# Patient Record
Sex: FEMALE | Race: WHITE | Employment: UNEMPLOYED | URBAN - METROPOLITAN AREA
[De-identification: names, ages, dates, MRNs, and addresses within clinical notes are randomized per-mention and may not be internally consistent; named-entity substitution may affect disease eponyms.]

---

## 2017-12-07 ENCOUNTER — TELEPHONE (OUTPATIENT)
Dept: SURGERY | Facility: CLINIC | Age: 37
End: 2017-12-07

## 2020-01-02 ENCOUNTER — WALK IN (OUTPATIENT)
Dept: URGENT CARE | Age: 40
End: 2020-01-02

## 2020-01-02 VITALS
OXYGEN SATURATION: 96 % | SYSTOLIC BLOOD PRESSURE: 114 MMHG | WEIGHT: 116.4 LBS | DIASTOLIC BLOOD PRESSURE: 70 MMHG | RESPIRATION RATE: 18 BRPM | BODY MASS INDEX: 19.39 KG/M2 | HEIGHT: 65 IN | HEART RATE: 88 BPM | TEMPERATURE: 98.6 F

## 2020-01-02 DIAGNOSIS — J01.10 ACUTE NON-RECURRENT FRONTAL SINUSITIS: Primary | ICD-10-CM

## 2020-01-02 LAB
INTERNAL PROCEDURAL CONTROLS ACCEPTABLE: YES
S PYO AG THROAT QL IA.RAPID: NEGATIVE

## 2020-01-02 PROCEDURE — X0943 AMG SELF PAY VISIT: HCPCS | Performed by: NURSE PRACTITIONER

## 2020-01-02 RX ORDER — LEVOTHYROXINE SODIUM 0.03 MG/1
TABLET ORAL
Refills: 2 | COMMUNITY
Start: 2019-11-14

## 2020-01-02 RX ORDER — AZITHROMYCIN 250 MG/1
TABLET, FILM COATED ORAL
Qty: 6 TABLET | Refills: 0 | Status: SHIPPED | OUTPATIENT
Start: 2020-01-02 | End: 2020-01-07

## 2020-01-02 SDOH — HEALTH STABILITY: MENTAL HEALTH: HOW OFTEN DO YOU HAVE A DRINK CONTAINING ALCOHOL?: NEVER

## 2020-01-02 ASSESSMENT — ENCOUNTER SYMPTOMS
SINUS PAIN: 1
SORE THROAT: 1
FEVER: 1
RHINORRHEA: 1
FATIGUE: 1
EYES NEGATIVE: 1
COUGH: 1
FACIAL SWELLING: 1

## 2020-03-19 ENCOUNTER — HOSPITAL ENCOUNTER (EMERGENCY)
Facility: HOSPITAL | Age: 40
Discharge: LEFT BEFORE DISCHARGE | End: 2020-03-19
Payer: MEDICARE

## 2020-03-19 VITALS
BODY MASS INDEX: 17.07 KG/M2 | HEIGHT: 64 IN | RESPIRATION RATE: 16 BRPM | WEIGHT: 100 LBS | DIASTOLIC BLOOD PRESSURE: 77 MMHG | HEART RATE: 111 BPM | TEMPERATURE: 98 F | OXYGEN SATURATION: 96 % | SYSTOLIC BLOOD PRESSURE: 120 MMHG

## 2020-03-19 RX ORDER — LEVOTHYROXINE SODIUM 0.03 MG/1
25 TABLET ORAL
COMMUNITY

## 2023-12-12 PROBLEM — F32.A DEPRESSION: Status: ACTIVE | Noted: 2023-12-12

## 2023-12-12 PROBLEM — F29 PSYCHOSIS (HCC): Status: ACTIVE | Noted: 2023-12-12

## 2023-12-12 PROBLEM — R62.50 DEVELOPMENTAL DELAY: Status: ACTIVE | Noted: 2023-12-12

## 2024-04-03 ENCOUNTER — OFFICE VISIT (OUTPATIENT)
Facility: LOCATION | Age: 44
End: 2024-04-03
Payer: MEDICARE

## 2024-04-03 DIAGNOSIS — H93.293 ABNORMAL AUDITORY PERCEPTION OF BOTH EARS: Primary | ICD-10-CM

## 2024-04-03 DIAGNOSIS — H90.0 CONDUCTIVE HEARING LOSS, BILATERAL: Primary | ICD-10-CM

## 2024-04-03 DIAGNOSIS — H90.3 SENSORY HEARING LOSS, BILATERAL: ICD-10-CM

## 2024-04-03 PROCEDURE — 92557 COMPREHENSIVE HEARING TEST: CPT | Performed by: AUDIOLOGIST

## 2024-04-03 PROCEDURE — 99204 OFFICE O/P NEW MOD 45 MIN: CPT | Performed by: OTOLARYNGOLOGY

## 2024-04-03 NOTE — PROGRESS NOTES
Susan was seen for an audiometric evaluation today.  Referred back to physician.      Inga Pollard M.A., JACE-A  Audiologist

## 2024-04-03 NOTE — PROGRESS NOTES
[facility], [facility]    Report of Consultation    Date of Consult: 4/3/2024     Reason for Consultation:   Decreased hearing.    History of Present Illness:   Patient is a 43 year old female who is being seen for decreased hearing.  She is worn a hearing aid for years and her right side and recently was told there was significant wax.  She complains of being plugged up on that side.  She also had history of mastoidectomy for a atretic ear canal in [place] but surgery was not successful.  She had been seen at the [place] [facility] who really did not want further surgical intervention.    Past Medical History  Past Medical History:   Diagnosis Date    Autism (HCC)     Depression     Hearing impaired     Thyroid disease        Past Surgical History  Past Surgical History:   Procedure Laterality Date    OTHER SURGICAL HISTORY  1987    ear surgery    REMOVAL GALLBLADDER         Family History  History reviewed. No pertinent family history.    Social History  Pediatric History   Patient Parents    Kendra Cowan (Mother)    Sheldon Cowan (Father)     Other Topics Concern    Not on file   Social History Narrative    Not on file           Current Medications:  Current Outpatient Medications   Medication Sig Dispense Refill    ergocalciferol 1.25 MG (79408 UT) Oral Cap Take 1 capsule (50,000 Units total) by mouth once a week.      FLUoxetine 10 MG Oral Cap Take 1 capsule (10 mg total) by mouth daily.      ferrous sulfate 325 (65 FE) MG Oral Tab EC Take 1 tablet (325 mg total) by mouth daily.      Brexpiprazole (REXULTI) 1 MG Oral Tab Take 1 mg by mouth daily. Pt started taking medication yesterday      Levothyroxine Sodium 25 MCG Oral Tab Take 1 tablet (25 mcg total) by mouth before breakfast.      Cyanocobalamin (VITAMIN B 12 OR) Take by mouth.      docusate sodium 100 MG Oral Cap Take 100 mg by mouth 2 (two) times daily.      FLUoxetine HCl 10 MG Oral Cap Take 2 capsules (20 mg  total) by mouth daily.      levothyroxine Sodium in Sodium Chloride Inject 25 mcg into the vein daily. (Patient not taking: Reported on 12/11/2023)         Allergies  No Known Allergies    Review of Systems:   A comprehensive review of systems was negative.    Physical Exam:   Last menstrual period 12/09/2023.         Constitutional Normal Overall appearance - Normal.   Psychiatric Normal Orientation - Oriented to time, place, person & situation. Appropriate mood and affect.   Head/Face Normal Facial features -- Normal. Skull - Normal.   Eyes Normal Pupils equal ,round ,react to light and accomidate   Ears Normal External Ear Right: Normal, Left: Normal. Canal - Right: Significant amount of wax was removed by suction, Left: Ends in a blind pouch without appreciable TM TM - Right: Normal,    Nose Normal External Nose, Normal, Septum -Midline, Right, Left Turbinates - Right: Normal, Hypertrophic Left: Normal, Hypertrophic   Mouth/Throat Normal Lips/teeth/gums - Normal. Tonsils - Normal. Oropharynx - Normal.   Neck Exam Normal Inspection - Normal. Palpation - Normal. Parotid gland - Normal. Thyroid gland - Normal.   Neurological Normal Memory - Normal. Cranial nerves - Cranial nerves II through XII grossly intact.   Nasopharynx Normal  Normal        Skin Normal Inspection - Normal.        Lymph Detail Normal Submental. Submandibular. Anterior cervical. Posterior cervical. Supraclavicular.     Audiogram shows a moderate sensorineural hearing loss with superimposed conductive loss.  Results:     Laboratory Data:  Lab Results   Component Value Date    WBC 6.4 12/11/2023    HGB 10.6 (L) 12/11/2023    HCT 33.7 (L) 12/11/2023    .0 (L) 12/11/2023    CREATSERUM 1.04 (H) 12/11/2023    BUN 5 (L) 12/11/2023     12/11/2023    K 3.8 12/11/2023     12/11/2023    CO2 28.0 12/11/2023     (H) 12/11/2023    CA 8.3 (L) 12/11/2023    ALB 3.5 12/11/2023    ALKPHO 67 12/11/2023    TP 6.7 12/11/2023    AST <3 (L)  12/11/2023    ALT 8 (L) 12/11/2023    PTT 28.2 03/12/2020    TSH 0.99 03/12/2020    LIP 97 06/19/2016    ETOH <3 12/11/2023         Imaging:  No results found.      Impression:   Right-sided cerumen impaction is resolved.  Moderate concern hearing loss with superimposed conductive loss.    Recommendations:  She is instructed how to clean wax and will see her audiologist regarding amplification.  I recommend they see  for evaluation for possible bone implantable hearing aid or if anything else surgically can be done to either ear.    The patient understands her treatment plan.      Thank you for allowing me to participate in the care of your patient.      Erick Smiley MD  4/3/2024

## 2025-01-10 ENCOUNTER — MED REC SCAN ONLY (OUTPATIENT)
Facility: LOCATION | Age: 45
End: 2025-01-10

## 2025-02-19 ENCOUNTER — OFFICE VISIT (OUTPATIENT)
Facility: LOCATION | Age: 45
End: 2025-02-19
Payer: MEDICARE

## 2025-02-19 DIAGNOSIS — H61.23 BILATERAL IMPACTED CERUMEN: Primary | ICD-10-CM

## 2025-02-19 DIAGNOSIS — Q16.1 ATRESIA OF EXTERNAL AUDITORY CANAL: ICD-10-CM

## 2025-02-19 PROCEDURE — 92504 EAR MICROSCOPY EXAMINATION: CPT | Performed by: OTOLARYNGOLOGY

## 2025-02-19 PROCEDURE — 99214 OFFICE O/P EST MOD 30 MIN: CPT | Performed by: OTOLARYNGOLOGY

## 2025-02-19 NOTE — PROGRESS NOTES
Susan Cowan is a 44 year old female. No chief complaint on file.    HPI:   44-year-old female has hearing in the right ear and history of atresia left side surgery was unsuccessful.  Comes in for wax removal.  Current Outpatient Medications   Medication Sig Dispense Refill    ergocalciferol 1.25 MG (58765 UT) Oral Cap Take 1 capsule (50,000 Units total) by mouth once a week.      FLUoxetine 10 MG Oral Cap Take 1 capsule (10 mg total) by mouth daily.      ferrous sulfate 325 (65 FE) MG Oral Tab EC Take 1 tablet (325 mg total) by mouth daily.      Brexpiprazole (REXULTI) 1 MG Oral Tab Take 1 mg by mouth daily. Pt started taking medication yesterday      Levothyroxine Sodium 25 MCG Oral Tab Take 1 tablet (25 mcg total) by mouth before breakfast.      Cyanocobalamin (VITAMIN B 12 OR) Take by mouth.      docusate sodium 100 MG Oral Cap Take 100 mg by mouth 2 (two) times daily.      FLUoxetine HCl 10 MG Oral Cap Take 2 capsules (20 mg total) by mouth daily.      levothyroxine Sodium in Sodium Chloride Inject 25 mcg into the vein daily. (Patient not taking: Reported on 12/11/2023)        Past Medical History:    Autism (HCC)    Depression    Hearing impaired    Thyroid disease      Social History:  Social History     Socioeconomic History    Marital status: Single   Tobacco Use    Smoking status: Never   Substance and Sexual Activity    Alcohol use: Never     Social Drivers of Health     Food Insecurity: No Food Insecurity (12/17/2023)    [facility], [facility]    Hunger Vital Sign     Worried About Running Out of Food in the Last Year: Never true     Ran Out of Food in the Last Year: Never true   Transportation Needs: No Transportation Needs (12/17/2023)    Received from [facility], [facility]    PRAPARE - Transportation     Lack of Transportation (Medical): No     Lack of Transportation (Non-Medical): No    Housing Stability: Low Risk  (12/17/2023)    Received from [facility], [facility]    Housing Stability Vital Sign     Unable to Pay for Housing in the Last Year: No     Number of Places Lived in the Last Year: 1     Unstable Housing in the Last Year: No      Past Surgical History:   Procedure Laterality Date    Other surgical history  1987    ear surgery    Removal gallbladder           REVIEW OF SYSTEMS:   GENERAL HEALTH: feels well otherwise  GENERAL : denies fever, chills, sweats, weight loss, weight gain  SKIN: denies any unusual skin lesions or rashes  RESPIRATORY: denies shortness of breath with exertion  NEURO: denies headaches    EXAM:   There were no vitals taken for this visit.    System Pertinent findings Details   Constitutional  Overall appearance - Normal.   Head/Face  Facial features -- Normal. Skull - Normal.   Eyes  Pupils equal ,round ,react to light and accomidate   Ears  External Ear Right: Normal, Left: Normal. Canal - Right: Normal, Left: Normal. TM - Right: Wax removed TM normal left: Some wax removed although atretic ear canal noted   Nose  External Nose, Normal, Septum -midline,Nasal Vault, clear. Turbinates - Right: Normal left: Normal   Mouth/Throat  Lips/teeth/gums - Normal. Tonsils -1+ oropharynx - Normal.   Neck Exam  Inspection - Normal. Palpation - Normal. Parotid gland - Normal. Thyroid gland -normal   Lymph Detail  Submental. Submandibular. Anterior cervical. Posterior cervical. Supraclavicular.     Patients exam showed wax impaction right ear, wax impaction left ear. Ear wax was removed under the operative microscope. No complications. Patient tolerated the procedure well. Ear exam findings listed in note after removal.    ASSESSMENT AND PLAN:   1. Bilateral impacted cerumen  3-month follow-up    2. Atresia of external auditory canal  Referral to neurotology Dr. Antwon Gastelum consideration whether BAHA implant could be done at [facility]  ear      The patient indicates understanding of these issues and agrees to the plan.      Braeden Arriaga MD  2/19/2025  5:00 PM

## 2025-05-19 ENCOUNTER — OFFICE VISIT (OUTPATIENT)
Facility: LOCATION | Age: 45
End: 2025-05-19
Payer: MEDICARE

## 2025-05-19 DIAGNOSIS — H61.23 BILATERAL IMPACTED CERUMEN: Primary | ICD-10-CM

## 2025-05-19 PROCEDURE — 99214 OFFICE O/P EST MOD 30 MIN: CPT | Performed by: OTOLARYNGOLOGY

## 2025-05-19 PROCEDURE — 92504 EAR MICROSCOPY EXAMINATION: CPT | Performed by: OTOLARYNGOLOGY

## 2025-05-19 NOTE — PROGRESS NOTES
Susan Cowan is a 44 year old female. No chief complaint on file.    HPI:   44-year-old white female history of wax impaction patient wears hearing aid in the right ear.  Patient had atresia of the left ear with surgery being unsuccessful.  Therefore only hearing ear is the right ear.  Current Medications[1]   Past Medical History[2]   Social History:  Short Social Hx on File[3]   Past Surgical History[4]      REVIEW OF SYSTEMS:   GENERAL HEALTH: feels well otherwise  GENERAL : denies fever, chills, sweats, weight loss, weight gain  SKIN: denies any unusual skin lesions or rashes  RESPIRATORY: denies shortness of breath with exertion  NEURO: denies headaches    EXAM:   There were no vitals taken for this visit.    System Pertinent findings Details   Constitutional  Overall appearance - Normal.   Head/Face  Facial features -- Normal. Skull - Normal.   Eyes  Pupils equal ,round ,react to light and accomidate   Ears  External Ear Right: Normal, Left: Normal. Canal - Right: Normal, Left: Normal. TM - Right: Wax removed TM normal left: Wax removed this is the atresia ear cannot see TM   Nose  External Nose, Normal, Septum -midline,Nasal Vault, clear. Turbinates - Right: Normal left: Normal   Mouth/Throat  Lips/teeth/gums - Normal. Tonsils -1+ oropharynx - Normal.   Neck Exam  Inspection - Normal. Palpation - Normal. Parotid gland - Normal. Thyroid gland -normal   Lymph Detail  Submental. Submandibular. Anterior cervical. Posterior cervical. Supraclavicular.   Patients exam showed wax impaction right ear, wax impaction left ear. Ear wax was removed under the operative microscope. No complications. Patient tolerated the procedure well. Ear exam findings listed in note after removal.      ASSESSMENT AND PLAN:   1. Bilateral impacted cerumen  Follow-up 3 months      The patient indicates understanding of these issues and agrees to the plan.      Braeden Arriaga MD  5/19/2025  4:09 PM       [1]   Current Outpatient  Medications   Medication Sig Dispense Refill    ergocalciferol 1.25 MG (64511 UT) Oral Cap Take 1 capsule (50,000 Units total) by mouth once a week.      FLUoxetine 10 MG Oral Cap Take 1 capsule (10 mg total) by mouth daily.      ferrous sulfate 325 (65 FE) MG Oral Tab EC Take 1 tablet (325 mg total) by mouth daily.      Brexpiprazole (REXULTI) 1 MG Oral Tab Take 1 mg by mouth daily. Pt started taking medication yesterday      Levothyroxine Sodium 25 MCG Oral Tab Take 1 tablet (25 mcg total) by mouth before breakfast.      Cyanocobalamin (VITAMIN B 12 OR) Take by mouth.      docusate sodium 100 MG Oral Cap Take 100 mg by mouth 2 (two) times daily.      FLUoxetine HCl 10 MG Oral Cap Take 2 capsules (20 mg total) by mouth daily.      levothyroxine Sodium in Sodium Chloride Inject 25 mcg into the vein daily. (Patient not taking: Reported on 12/11/2023)     [2]   Past Medical History:   Autism (HCC)    Depression    Hearing impaired    Thyroid disease   [3]   Social History  Socioeconomic History    Marital status: Single   Tobacco Use    Smoking status: Never   Substance and Sexual Activity    Alcohol use: Never     Social Drivers of Health     Food Insecurity: No Food Insecurity (12/17/2023)    Received from [facility]    Hunger Vital Sign     Worried About Running Out of Food in the Last Year: Never true     Ran Out of Food in the Last Year: Never true   Transportation Needs: No Transportation Needs (12/17/2023)    Received from [facility]    PRAPARE - Transportation     Lack of Transportation (Medical): No     Lack of Transportation (Non-Medical): No   Housing Stability: Low Risk  (12/17/2023)    Received from [facility]    Housing Stability Vital Sign     Unable to Pay for Housing in the Last Year: No     Number of Places Lived in the Last Year: 1     Unstable Housing in the Last Year: No   [4]   Past Surgical History:  Procedure Laterality  Date    Other surgical history  1987    ear surgery    Removal gallbladder

## 2025-08-19 ENCOUNTER — OFFICE VISIT (OUTPATIENT)
Facility: LOCATION | Age: 45
End: 2025-08-19

## 2025-08-19 DIAGNOSIS — Q16.1 ATRESIA OF EXTERNAL AUDITORY CANAL: Primary | ICD-10-CM

## 2025-08-19 DIAGNOSIS — H61.21 IMPACTED CERUMEN OF RIGHT EAR: ICD-10-CM

## 2025-08-19 PROCEDURE — 99213 OFFICE O/P EST LOW 20 MIN: CPT | Performed by: OTOLARYNGOLOGY

## 2025-08-19 RX ORDER — CIPROFLOXACIN AND DEXAMETHASONE 3; 1 MG/ML; MG/ML
3 SUSPENSION/ DROPS AURICULAR (OTIC) EVERY 12 HOURS
Qty: 1 EACH | Refills: 0 | Status: SHIPPED | OUTPATIENT
Start: 2025-08-19 | End: 2025-08-26